# Patient Record
Sex: FEMALE | Race: WHITE
[De-identification: names, ages, dates, MRNs, and addresses within clinical notes are randomized per-mention and may not be internally consistent; named-entity substitution may affect disease eponyms.]

---

## 2017-01-13 ENCOUNTER — HOSPITAL ENCOUNTER (OUTPATIENT)
Age: 52
Discharge: HOME | End: 2017-01-13
Payer: COMMERCIAL

## 2017-01-13 DIAGNOSIS — E03.9: Primary | ICD-10-CM

## 2017-01-13 DIAGNOSIS — F41.8: ICD-10-CM

## 2017-01-13 DIAGNOSIS — Z13.29: ICD-10-CM

## 2017-01-13 DIAGNOSIS — R94.5: ICD-10-CM

## 2017-01-13 DIAGNOSIS — Z13.1: ICD-10-CM

## 2017-01-13 DIAGNOSIS — R19.7: ICD-10-CM

## 2017-01-20 ENCOUNTER — HOSPITAL ENCOUNTER (OUTPATIENT)
Age: 52
Discharge: HOME | End: 2017-01-20
Payer: COMMERCIAL

## 2017-01-20 DIAGNOSIS — Z13.29: ICD-10-CM

## 2017-01-20 DIAGNOSIS — R94.5: ICD-10-CM

## 2017-01-20 DIAGNOSIS — R19.7: ICD-10-CM

## 2017-01-20 DIAGNOSIS — Z13.1: ICD-10-CM

## 2017-01-20 DIAGNOSIS — F41.8: ICD-10-CM

## 2017-01-20 DIAGNOSIS — E03.9: Primary | ICD-10-CM

## 2017-11-22 ENCOUNTER — HOSPITAL ENCOUNTER (OUTPATIENT)
Dept: HOSPITAL 76 - LAB.WCP | Age: 52
Discharge: HOME | End: 2017-11-22
Attending: NATUROPATH
Payer: COMMERCIAL

## 2017-11-22 DIAGNOSIS — E03.9: Primary | ICD-10-CM

## 2017-11-22 LAB — TSH SERPL-ACNC: 4.06 UIU/ML (ref 0.34–5.6)

## 2017-11-22 PROCEDURE — 84481 FREE ASSAY (FT-3): CPT

## 2017-11-22 PROCEDURE — 84482 T3 REVERSE: CPT

## 2017-11-22 PROCEDURE — 84443 ASSAY THYROID STIM HORMONE: CPT

## 2017-11-22 PROCEDURE — 36415 COLL VENOUS BLD VENIPUNCTURE: CPT

## 2017-11-22 PROCEDURE — 84439 ASSAY OF FREE THYROXINE: CPT

## 2018-01-11 ENCOUNTER — HOSPITAL ENCOUNTER (OUTPATIENT)
Dept: HOSPITAL 76 - LAB.R | Age: 53
Discharge: HOME | End: 2018-01-11
Attending: PODIATRIST
Payer: COMMERCIAL

## 2018-01-11 DIAGNOSIS — L03.031: Primary | ICD-10-CM

## 2018-01-11 PROCEDURE — 87205 SMEAR GRAM STAIN: CPT

## 2018-01-11 PROCEDURE — 87070 CULTURE OTHR SPECIMN AEROBIC: CPT

## 2019-03-25 ENCOUNTER — HOSPITAL ENCOUNTER (OUTPATIENT)
Dept: HOSPITAL 76 - LAB.R | Age: 54
Discharge: HOME | End: 2019-03-25
Attending: PODIATRIST
Payer: COMMERCIAL

## 2019-03-25 DIAGNOSIS — L03.031: Primary | ICD-10-CM

## 2019-03-25 PROCEDURE — 87070 CULTURE OTHR SPECIMN AEROBIC: CPT

## 2019-03-25 PROCEDURE — 87205 SMEAR GRAM STAIN: CPT

## 2019-04-18 ENCOUNTER — HOSPITAL ENCOUNTER (OUTPATIENT)
Dept: HOSPITAL 76 - LAB.R | Age: 54
Discharge: HOME | End: 2019-04-18
Attending: PODIATRIST
Payer: COMMERCIAL

## 2019-04-18 DIAGNOSIS — L03.031: Primary | ICD-10-CM

## 2019-04-18 PROCEDURE — 87181 SC STD AGAR DILUTION PER AGT: CPT

## 2019-04-18 PROCEDURE — 87205 SMEAR GRAM STAIN: CPT

## 2019-04-18 PROCEDURE — 87070 CULTURE OTHR SPECIMN AEROBIC: CPT

## 2019-08-29 ENCOUNTER — HOSPITAL ENCOUNTER (EMERGENCY)
Dept: HOSPITAL 76 - ED | Age: 54
Discharge: ANOTHER HOSPITAL | End: 2019-08-29
Payer: COMMERCIAL

## 2019-08-29 ENCOUNTER — HOSPITAL ENCOUNTER (OUTPATIENT)
Dept: HOSPITAL 76 - EMS | Age: 54
Discharge: ANOTHER HOSPITAL | End: 2019-08-29
Attending: SURGERY
Payer: COMMERCIAL

## 2019-08-29 VITALS — SYSTOLIC BLOOD PRESSURE: 139 MMHG | DIASTOLIC BLOOD PRESSURE: 67 MMHG

## 2019-08-29 DIAGNOSIS — D50.0: Primary | ICD-10-CM

## 2019-08-29 DIAGNOSIS — N93.9: ICD-10-CM

## 2019-08-29 DIAGNOSIS — D25.9: ICD-10-CM

## 2019-08-29 DIAGNOSIS — D25.9: Primary | ICD-10-CM

## 2019-08-29 DIAGNOSIS — D64.9: ICD-10-CM

## 2019-08-29 LAB
ALBUMIN DIAFP-MCNC: 3.5 G/DL (ref 3.2–5.5)
ALBUMIN/GLOB SERPL: 1 {RATIO} (ref 1–2.2)
ALP SERPL-CCNC: 76 IU/L (ref 42–121)
ALT SERPL W P-5'-P-CCNC: 61 IU/L (ref 10–60)
ANION GAP SERPL CALCULATED.4IONS-SCNC: 14 MMOL/L (ref 6–13)
AST SERPL W P-5'-P-CCNC: 48 IU/L (ref 10–42)
B-HCG SERPL QL: NEGATIVE
BASOPHILS # BLD MANUAL: 0 10^3/UL (ref 0–0.1)
BASOPHILS NFR BLD AUTO: 0.3 %
BILIRUB BLD-MCNC: 0.6 MG/DL (ref 0.2–1)
BILIRUB UR QL CFM: NEGATIVE
BUN SERPL-MCNC: 11 MG/DL (ref 6–20)
CALCIUM UR-MCNC: 8.9 MG/DL (ref 8.5–10.3)
CASTS URNS QL MICRO: (no result) /LPF
CHLORIDE SERPL-SCNC: 107 MMOL/L (ref 101–111)
CLARITY UR REFRACT.AUTO: CLEAR
CO2 SERPL-SCNC: 19 MMOL/L (ref 21–32)
CREAT SERPLBLD-SCNC: 1 MG/DL (ref 0.4–1)
EOSINOPHIL # BLD MANUAL: 0.2 10^3/UL (ref 0–0.7)
EOSINOPHIL NFR BLD AUTO: 0.5 %
ERYTHROCYTE [DISTWIDTH] IN BLOOD BY AUTOMATED COUNT: 14.1 % (ref 12–15)
GFRSERPLBLD MDRD-ARVRAT: 57 ML/MIN/{1.73_M2} (ref 89–?)
GLOBULIN SER-MCNC: 3.4 G/DL (ref 2.1–4.2)
GLUCOSE SERPL-MCNC: 126 MG/DL (ref 70–100)
GLUCOSE UR QL STRIP.AUTO: NEGATIVE MG/DL
HGB UR QL STRIP: 9.2 G/DL (ref 12–16)
KETONES UR QL STRIP.AUTO: NEGATIVE MG/DL
LIPASE SERPL-CCNC: 21 U/L (ref 22–51)
LYMPH ABN NFR BLD MANUAL: 0 %
LYMPHOBLASTS # BLD: 21 %
LYMPHOCYTES # BLD MANUAL: 4.5 10^3/UL (ref 1.5–3.5)
LYMPHOCYTES NFR BLD AUTO: 16.8 %
MANUAL DIF COMMENT BLD-IMP: (no result)
MCH RBC QN AUTO: 30.7 PG (ref 27–31)
MCHC RBC AUTO-ENTMCNC: 32.2 G/DL (ref 32–36)
MCV RBC AUTO: 95.3 FL (ref 81–99)
METAMYELOCYTES NFR BLD: 2 %
MONOCYTES # BLD MANUAL: 0.4 10^3/UL (ref 0–1)
MONOCYTES NFR BLD AUTO: 6.3 %
MUCOUS THREADS URNS QL MICRO: (no result)
NEUTROPHILS # SNV AUTO: 21.4 X10^3/UL (ref 4.8–10.8)
NEUTROPHILS NFR BLD AUTO: 73.5 %
NEUTS BAND NFR BLD: 4 %
NITRITE UR QL STRIP.AUTO: NEGATIVE
PDW BLD AUTO: 10.3 FL (ref 7.9–10.8)
PH UR STRIP.AUTO: 6 PH (ref 5–7.5)
PLAT MORPH BLD: (no result)
PLATELET # BLD: 270 10^3/UL (ref 130–450)
PLATELET BLD QL SMEAR: (no result)
PROT SPEC-MCNC: 6.9 G/DL (ref 6.7–8.2)
PROT UR STRIP.AUTO-MCNC: 100 MG/DL
RBC # UR STRIP.AUTO: (no result) /UL
RBC MAR: 3 10^6/UL (ref 4.2–5.4)
RBC MORPH BLD: (no result)
SODIUM SERPLBLD-SCNC: 140 MMOL/L (ref 135–145)
SP GR UR STRIP.AUTO: 1.02 (ref 1–1.03)
SQUAMOUS URNS QL MICRO: (no result)
UROBILINOGEN UR QL STRIP.AUTO: (no result) E.U./DL
UROBILINOGEN UR STRIP.AUTO-MCNC: NEGATIVE MG/DL

## 2019-08-29 PROCEDURE — 96365 THER/PROPH/DIAG IV INF INIT: CPT

## 2019-08-29 PROCEDURE — 81003 URINALYSIS AUTO W/O SCOPE: CPT

## 2019-08-29 PROCEDURE — 86900 BLOOD TYPING SEROLOGIC ABO: CPT

## 2019-08-29 PROCEDURE — 85025 COMPLETE CBC W/AUTO DIFF WBC: CPT

## 2019-08-29 PROCEDURE — 84703 CHORIONIC GONADOTROPIN ASSAY: CPT

## 2019-08-29 PROCEDURE — 93005 ELECTROCARDIOGRAM TRACING: CPT

## 2019-08-29 PROCEDURE — 81001 URINALYSIS AUTO W/SCOPE: CPT

## 2019-08-29 PROCEDURE — 85014 HEMATOCRIT: CPT

## 2019-08-29 PROCEDURE — 83690 ASSAY OF LIPASE: CPT

## 2019-08-29 PROCEDURE — 36415 COLL VENOUS BLD VENIPUNCTURE: CPT

## 2019-08-29 PROCEDURE — 80053 COMPREHEN METABOLIC PANEL: CPT

## 2019-08-29 PROCEDURE — 99285 EMERGENCY DEPT VISIT HI MDM: CPT

## 2019-08-29 PROCEDURE — 87086 URINE CULTURE/COLONY COUNT: CPT

## 2019-08-29 PROCEDURE — 96361 HYDRATE IV INFUSION ADD-ON: CPT

## 2019-08-29 PROCEDURE — 96375 TX/PRO/DX INJ NEW DRUG ADDON: CPT

## 2019-08-29 PROCEDURE — 36430 TRANSFUSION BLD/BLD COMPNT: CPT

## 2019-08-29 PROCEDURE — 99284 EMERGENCY DEPT VISIT MOD MDM: CPT

## 2019-08-29 PROCEDURE — 86850 RBC ANTIBODY SCREEN: CPT

## 2019-08-29 PROCEDURE — 86920 COMPATIBILITY TEST SPIN: CPT

## 2019-08-29 PROCEDURE — 51701 INSERT BLADDER CATHETER: CPT

## 2019-08-29 PROCEDURE — 86901 BLOOD TYPING SEROLOGIC RH(D): CPT

## 2019-08-29 NOTE — ED PHYSICIAN DOCUMENTATION
PD HPI FEMALE 





- Stated complaint


Stated Complaint: SOA/SENT BY 





- Chief complaint


Chief Complaint: Cardiac





- History obtained from


History obtained from: Patient





- History of Present Illness


Timing - onset: How many weeks ago (several)


Timing - details: Still present


Associated symptoms: Other (exertional dyspnea)


Recently seen: Clinic (Seen in Gyn clinic yesterday.)





- Additional information


Additional information: 





The patient is a 54-year-old female who has had vaginal bleeding for more than a

month.  She reports shortness of breath for the past 4 days with any exertional 

activity.  She was seen in GYN clinic in [place] yesterday and was referred to 

[facility] for an urgent hysterectomy for fibroids.  CBC yesterday revealed 

hemoglobin of 10.0 and hematocrit 31.  When results were called to her this 

morning and she reported shortness of breath, she was advised to come to the 

emergency department to have repeat lab draw.  She denies chest pain, cough, or 

abdominal pain.  She reports nausea without recent vomiting.  She denies any 

change in the color of her stools.  She denies dysuria.





Review of Systems


Constitutional: denies: Fever


Nose: denies: Congestion


Throat: denies: Sore throat


Cardiac: denies: Chest pain / pressure, Palpitations


Respiratory: reports: Dyspnea (Exertional.).  denies: Cough


GI: reports: Nausea.  denies: Abdominal Pain, Vomiting, Bloody / black stool


: reports: Vaginal bleeding.  denies: Dysuria


Skin: denies: Rash


Musculoskeletal: denies: Back pain


Neurologic: denies: Focal weakness, Numbness, Headache





PD PAST MEDICAL HISTORY





- Past Medical History


Respiratory: Asthma


Endocrine/Autoimmune: HyPOthyroidism


GYN: Fibroids





- Past Surgical History


Past Surgical History: Yes


General: Cholecystectomy


Ortho: Knee replacement





- Present Medications


Home Medications: 


                                Ambulatory Orders











 Medication  Instructions  Recorded  Confirmed


 


Fluticasone/Salmeterol [Advair  09/03/14 09/03/14





250-50 Diskus]   


 


HYDROcod/ACETAM 5/325 [Vicodin 1 - 2 ea PO Q6H PRN #15 tablet 09/03/14 





5/325]   


 


Levothyroxine Sodium  09/03/14 09/03/14














- Allergies


Allergies/Adverse Reactions: 


                                    Allergies











Allergy/AdvReac Type Severity Reaction Status Date / Time


 


No Known Drug Allergies Allergy   Verified 09/03/14 10:38














- Social History


Does the pt smoke?: No


Smoking Status: Never smoker


Does the pt drink ETOH?: Yes


Does the pt have substance abuse?: No





- POLST


Patient has POLST: No





PD ED PE NORMAL





- Vitals


Vital signs reviewed: Yes (Mildly tachycardic with a pulse in the high 90s.)





- General


General: Alert and oriented X 3, Other (Morbidly obese.)





- HEENT


HEENT: Atraumatic, Moist mucous membranes, Pharynx benign





- Neck


Neck: No adenopathy, No JVD





- Cardiac


Cardiac: Other (Mildly tachycardic.)





- Respiratory


Respiratory: No respiratory distress, Clear bilaterally





- Abdomen


Abdomen: Soft, Non tender





- Female 


Female : Other (vaginal bleeding)





- Back


Back: No CVA TTP





- Derm


Derm: No rash





- Extremities


Extremities: No calf tenderness / cord





- Neuro


Neuro: Alert and oriented X 3





Results





- Vitals


Vitals: 


                               Vital Signs - 24 hr











  08/29/19 08/29/19 08/29/19





  10:57 13:16 13:58


 


Temperature 35.7 C L  


 


Heart Rate 125 H 78 99


 


Respiratory 18 19 20





Rate   


 


Blood Pressure 143/98 H 157/81 H 141/84 H


 


O2 Saturation 94 99 97














  08/29/19 08/29/19 08/29/19





  14:10 16:21 18:17


 


Temperature   


 


Heart Rate 100 102 H 79


 


Respiratory 24 19 19





Rate   


 


Blood Pressure 150/83 H 140/92 H 139/74 H


 


O2 Saturation 97 99 99














  08/29/19 08/29/19 08/29/19





  20:04 20:31 20:43


 


Temperature  37 C 37.1 C


 


Heart Rate 78 93 93


 


Respiratory 18 16 16





Rate   


 


Blood Pressure 141/80 H 144/67 H 139/67 H


 


O2 Saturation 98  99








                                     Oxygen











O2 Source                      Room air

















- Labs


Labs: 


                                Laboratory Tests











  08/29/19 08/29/19 08/29/19





  11:18 11:18 12:20


 


WBC  21.4 H  


 


RBC  3.00 L  


 


Hgb  9.2 L  


 


Hct  28.6 L  


 


MCV  95.3  


 


MCH  30.7  


 


MCHC  32.2  


 


RDW  14.1  


 


Plt Count  270  


 


MPV  10.3  


 


Neut # (Auto)  Not Reportable  


 


Lymph # (Auto)  Not Reportable  


 


Mono # (Auto)  Not Reportable  


 


Eos # (Auto)  Not Reportable  


 


Baso # (Auto)  Not Reportable  


 


Absolute Nucleated RBC  Not Reportable  


 


Total Counted  100  


 


Band Neuts % (Manual)  4  


 


Abnorm Lymph % (Manual)  0  


 


Metamyelocytes %  2 H  


 


Nucleated RBC %  Not Reportable  


 


Neutrophils # (Manual)  15.8 H  


 


Lymphocytes # (Manual)  4.5 H  


 


Monocytes # (Manual)  0.4  


 


Eosinophils # (Manual)  0.2  


 


Basophils # (Manual)  0.0  


 


Differential Comment  MANUAL DIFFERENTIAL  


 


WBC Morphology  NORMAL APPEARANCE  


 


Platelet Estimate  NORMAL (130-450,000)  


 


Platelet Morphology  NORMAL СВЕТЛАНА  


 


RBC Morph Micro Appear  1+ POLYCHROMASIA  


 


Sodium   140 


 


Potassium   3.5 


 


Chloride   107 


 


Carbon Dioxide   19 L 


 


Anion Gap   14.0 H 


 


BUN   11 


 


Creatinine   1.0 


 


Estimated GFR (MDRD)   57 L 


 


Glucose   126 H 


 


Calcium   8.9 


 


Total Bilirubin   0.6 


 


AST   48 H 


 


ALT   61 H 


 


Alkaline Phosphatase   76 


 


Total Protein   6.9 


 


Albumin   3.5 


 


Globulin   3.4 


 


Albumin/Globulin Ratio   1.0 


 


Lipase   21 L 


 


Serum HCG, Qual   


 


Urine Color    YELLOW


 


Urine Clarity    CLEAR


 


Urine pH    6.0


 


Ur Specific Gravity    1.025


 


Urine Protein    100 H


 


Urine Glucose (UA)    NEGATIVE


 


Urine Ketones    NEGATIVE


 


Urine Occult Blood    LARGE H


 


Urine Nitrite    NEGATIVE


 


Urine Bilirubin    NEGATIVE


 


Urine Urobilinogen    0.2 (NORMAL)


 


Ur Leukocyte Esterase    NEGATIVE


 


Urine RBC    11-25 H


 


Urine WBC    0-3


 


Ur Squamous Epith Cells    FEW Squamous


 


Urine Bacteria    Few


 


Urine Casts    0-2 RBC Casts


 


Urine Mucus    Few Strands


 


Ur Microscopic Review    INDICATED


 


Urine Culture Comments    NOT INDICATED


 


Blood Type   


 


Antibody Screen   


 


Crossmatch IS Only   














  08/29/19 08/29/19 08/29/19





  15:51 15:51 16:12


 


WBC   


 


RBC   


 


Hgb   


 


Hct  26.8 L  


 


MCV   


 


MCH   


 


MCHC   


 


RDW   


 


Plt Count   


 


MPV   


 


Neut # (Auto)   


 


Lymph # (Auto)   


 


Mono # (Auto)   


 


Eos # (Auto)   


 


Baso # (Auto)   


 


Absolute Nucleated RBC   


 


Total Counted   


 


Band Neuts % (Manual)   


 


Abnorm Lymph % (Manual)   


 


Metamyelocytes %   


 


Nucleated RBC %   


 


Neutrophils # (Manual)   


 


Lymphocytes # (Manual)   


 


Monocytes # (Manual)   


 


Eosinophils # (Manual)   


 


Basophils # (Manual)   


 


Differential Comment   


 


WBC Morphology   


 


Platelet Estimate   


 


Platelet Morphology   


 


RBC Morph Micro Appear   


 


Sodium   


 


Potassium   


 


Chloride   


 


Carbon Dioxide   


 


Anion Gap   


 


BUN   


 


Creatinine   


 


Estimated GFR (MDRD)   


 


Glucose   


 


Calcium   


 


Total Bilirubin   


 


AST   


 


ALT   


 


Alkaline Phosphatase   


 


Total Protein   


 


Albumin   


 


Globulin   


 


Albumin/Globulin Ratio   


 


Lipase   


 


Serum HCG, Qual   NEGATIVE 


 


Urine Color   


 


Urine Clarity   


 


Urine pH   


 


Ur Specific Gravity   


 


Urine Protein   


 


Urine Glucose (UA)   


 


Urine Ketones   


 


Urine Occult Blood   


 


Urine Nitrite   


 


Urine Bilirubin   


 


Urine Urobilinogen   


 


Ur Leukocyte Esterase   


 


Urine RBC   


 


Urine WBC   


 


Ur Squamous Epith Cells   


 


Urine Bacteria   


 


Urine Casts   


 


Urine Mucus   


 


Ur Microscopic Review   


 


Urine Culture Comments   


 


Blood Type    O POSITIVE


 


Antibody Screen    NEGATIVE


 


Crossmatch IS Only    See Detail














PD MEDICAL DECISION MAKING





- ED course


Complexity details: reviewed results, re-evaluated patient, considered 

differential, d/w patient, d/w family, d/w consultant


ED course: 





The patient's presentation is significant for ongoing vaginal bleeding with 

large uterine fibroids.  Her hemoglobin and hematocrit today are 9.2 and 28.6, 

which is slightly lower than yesterday.  Her white blood cell count is elevated 

at 21.4.  There is no evidence of pulmonary or urinary infection.  I discussed 

her presentation with Dr. Andujar, the gynecologist she saw yesterday.  He shares 

that the patient was referred urgently to [facility] for urgent hysterectomy.  

He was concerned with her history of sleep apnea and her obese body habitus that

hers would be a high risk surgical procedure.


Attempt to reach gynecologist on-call at [facility] was unsuccessful.  I 

was told by the triage person that since the patient is not established at 

[facility], I should fax all the clinical information and the on-call gynecologist 

would review that and call back.  More than 2 hours after faxing the information

to [facility] I have still not received a phone call.  Repeat hematocrit

was performed and was lower at 26.8, 4 hours after the initial hematocrit today 

of 28.6.  This is without her receiving any IV fluids.  She remained tachycardic

at rest, and continued to gush vaginal blood.  I discussed her condition again 

with Dr. Andujar, who agrees that the patient should be transferred to [place] for 

stabilization prior to undergoing any necessary surgical procedure.  After 

getting authorization from the [place] triage person, I discussed the patient's 

condition with the Dr. Chavez at [facility]. She then 

discussed transfer with her colleague, Dr. Gay, who agrees to accept the 

patient in transfer.  She agrees with starting a transfusion of packed red blood

cells, and advises administering 1 g of TXA.  Treatment in addition to those 

recommendations, included administration of fentanyl 75 mcg IV, followed later 

with morphine 4 mg IV.  Transfer forms were completed.





Departure





- Departure


Disposition: 02 Transfer Acute Care Hosp


Clinical Impression: 


 Vaginal bleeding, Anemia





Uterine fibroid


Qualifiers:


 Uterine leiomyoma location: unspecified location Qualified Code(s): D25.9 - 

Leiomyoma of uterus, unspecified





Dyspnea


Qualifiers:


 Dyspnea type: dyspnea on exertion Qualified Code(s): R06.09 - Other forms of 

dyspnea





Condition: Serious


Discharge Date/Time: 08/29/19 21:00

## 2019-08-29 NOTE — ED PHYSICIAN DOCUMENTATION
PD HPI Fall





- Stated complaint


Stated Complaint: SOA/SENT BY 





- Chief complaint


Chief Complaint: Cardiac





- History obtained from


History obtained from: Patient, EMS





PD PAST MEDICAL HISTORY





- Past Medical History


Respiratory: Asthma


Endocrine/Autoimmune: HyPOthyroidism





- Past Surgical History


Past Surgical History: Yes


General: Cholecystectomy


Ortho: Knee replacement





- Present Medications


Home Medications: 


                                Ambulatory Orders











 Medication  Instructions  Recorded  Confirmed


 


Fluticasone/Salmeterol [Advair  09/03/14 09/03/14





250-50 Diskus]   


 


HYDROcod/ACETAM 5/325 [Vicodin 1 - 2 ea PO Q6H PRN #15 tablet 09/03/14 





5/325]   


 


Levothyroxine Sodium  09/03/14 09/03/14














- Allergies


Allergies/Adverse Reactions: 


                                    Allergies











Allergy/AdvReac Type Severity Reaction Status Date / Time


 


No Known Drug Allergies Allergy   Verified 09/03/14 10:38














- Social History


Does the pt smoke?: No


Smoking Status: Never smoker


Does the pt drink ETOH?: Yes


Does the pt have substance abuse?: No





- POLST


Patient has POLST: No





Results





- Vitals


Vitals: 


                               Vital Signs - 24 hr











  08/29/19





  10:57


 


Temperature 35.7 C L


 


Heart Rate 125 H


 


Respiratory 18





Rate 


 


Blood Pressure 143/98 H


 


O2 Saturation 94








                                     Oxygen











O2 Source                      Room air

















- EKG (time done)


  ** 11:08


Rate: Rate (enter#) (113)


Rhythm: Sinus tachycardia


Axis: Normal


Intervals: Other (IVCD)


QRS: Poor R wave progression


Compare to prior EKG: Old EKG unavailable


Computer interpretation: Agree with computer





- Labs


Labs: 


                                Laboratory Tests











  08/29/19 08/29/19 08/29/19





  11:18 11:18 12:20


 


WBC  21.4 H  


 


RBC  3.00 L  


 


Hgb  9.2 L  


 


Hct  28.6 L  


 


MCV  95.3  


 


MCH  30.7  


 


MCHC  32.2  


 


RDW  14.1  


 


Plt Count  270  


 


MPV  10.3  


 


Neut # (Auto)  Not Reportable  


 


Lymph # (Auto)  Not Reportable  


 


Mono # (Auto)  Not Reportable  


 


Eos # (Auto)  Not Reportable  


 


Baso # (Auto)  Not Reportable  


 


Absolute Nucleated RBC  Not Reportable  


 


Total Counted  100  


 


Band Neuts % (Manual)  4  


 


Abnorm Lymph % (Manual)  0  


 


Metamyelocytes %  2 H  


 


Nucleated RBC %  Not Reportable  


 


Neutrophils # (Manual)  15.8 H  


 


Lymphocytes # (Manual)  4.5 H  


 


Monocytes # (Manual)  0.4  


 


Eosinophils # (Manual)  0.2  


 


Basophils # (Manual)  0.0  


 


Differential Comment  MANUAL DIFFERENTIAL  


 


WBC Morphology  NORMAL APPEARANCE  


 


Platelet Estimate  NORMAL (130-450,000)  


 


Platelet Morphology  NORMAL СВЕТЛАНА  


 


RBC Morph Micro Appear  1+ POLYCHROMASIA  


 


Sodium   140 


 


Potassium   3.5 


 


Chloride   107 


 


Carbon Dioxide   19 L 


 


Anion Gap   14.0 H 


 


BUN   11 


 


Creatinine   1.0 


 


Estimated GFR (MDRD)   57 L 


 


Glucose   126 H 


 


Calcium   8.9 


 


Total Bilirubin   0.6 


 


AST   48 H 


 


ALT   61 H 


 


Alkaline Phosphatase   76 


 


Total Protein   6.9 


 


Albumin   3.5 


 


Globulin   3.4 


 


Albumin/Globulin Ratio   1.0 


 


Lipase   21 L 


 


Urine Color    YELLOW


 


Urine Clarity    CLEAR


 


Urine pH    6.0


 


Ur Specific Gravity    1.025


 


Urine Protein    100 H


 


Urine Glucose (UA)    NEGATIVE


 


Urine Ketones    NEGATIVE


 


Urine Occult Blood    LARGE H


 


Urine Nitrite    NEGATIVE


 


Urine Bilirubin    NEGATIVE


 


Urine Urobilinogen    0.2 (NORMAL)


 


Ur Leukocyte Esterase    NEGATIVE


 


Urine RBC    11-25 H


 


Urine WBC    0-3


 


Ur Squamous Epith Cells    FEW Squamous


 


Urine Bacteria    Few


 


Urine Casts    0-2 RBC Casts


 


Urine Mucus    Few Strands


 


Ur Microscopic Review    INDICATED


 


Urine Culture Comments    NOT INDICATED














- Rads (name of study)


  ** right hip


Radiology: Prelim report reviewed, EMP read contemporaneously, See rad report 

(Fixation hardware along the proximal right femur with no hardware failure or 

loosening.  No acute fracture identified.)

## 2019-09-12 ENCOUNTER — HOSPITAL ENCOUNTER (OUTPATIENT)
Dept: HOSPITAL 76 - LAB.N | Age: 54
Discharge: HOME | End: 2019-09-12
Attending: FAMILY MEDICINE
Payer: COMMERCIAL

## 2019-09-12 DIAGNOSIS — I26.99: ICD-10-CM

## 2019-09-12 DIAGNOSIS — E03.9: Primary | ICD-10-CM

## 2019-09-12 DIAGNOSIS — D64.9: ICD-10-CM

## 2019-09-12 LAB
BASOPHILS NFR BLD AUTO: 0 10^3/UL (ref 0–0.1)
BASOPHILS NFR BLD AUTO: 0.4 %
CREAT SERPLBLD-SCNC: 0.9 MG/DL (ref 0.4–1)
EOSINOPHIL # BLD AUTO: 0.2 10^3/UL (ref 0–0.7)
EOSINOPHIL NFR BLD AUTO: 2.3 %
ERYTHROCYTE [DISTWIDTH] IN BLOOD BY AUTOMATED COUNT: 15.9 % (ref 12–15)
GFRSERPLBLD MDRD-ARVRAT: 65 ML/MIN/{1.73_M2} (ref 89–?)
HGB UR QL STRIP: 10.9 G/DL (ref 12–16)
INR PPP: 3.2 (ref 0.8–1.2)
LYMPHOCYTES # SPEC AUTO: 3.4 10^3/UL (ref 1.5–3.5)
LYMPHOCYTES NFR BLD AUTO: 33.1 %
MANUAL DIF COMMENT BLD-IMP: (no result)
MCH RBC QN AUTO: 28.7 PG (ref 27–31)
MCHC RBC AUTO-ENTMCNC: 29.9 G/DL (ref 32–36)
MCV RBC AUTO: 96.1 FL (ref 81–99)
MONOCYTES # BLD AUTO: 0.8 10^3/UL (ref 0–1)
MONOCYTES NFR BLD AUTO: 8 %
NEUTROPHILS # BLD AUTO: 5.2 10^3/UL (ref 1.5–6.6)
NEUTROPHILS # SNV AUTO: 10.2 X10^3/UL (ref 4.8–10.8)
NEUTROPHILS NFR BLD AUTO: 50.7 %
PDW BLD AUTO: 9.9 FL (ref 7.9–10.8)
PLAT MORPH BLD: (no result)
PLATELET # BLD: 438 10^3/UL (ref 130–450)
PLATELET BLD QL SMEAR: (no result)
PROTHROM ACT/NOR PPP: 35.6 SECS (ref 9.9–12.6)
RBC MAR: 3.8 10^6/UL (ref 4.2–5.4)
RBC MORPH BLD: (no result)

## 2019-09-12 PROCEDURE — 85610 PROTHROMBIN TIME: CPT

## 2019-09-12 PROCEDURE — 85025 COMPLETE CBC W/AUTO DIFF WBC: CPT

## 2019-09-12 PROCEDURE — 36415 COLL VENOUS BLD VENIPUNCTURE: CPT

## 2019-09-12 PROCEDURE — 82565 ASSAY OF CREATININE: CPT

## 2019-09-12 PROCEDURE — 84443 ASSAY THYROID STIM HORMONE: CPT

## 2019-09-17 ENCOUNTER — HOSPITAL ENCOUNTER (OUTPATIENT)
Dept: HOSPITAL 76 - LAB.N | Age: 54
Discharge: HOME | End: 2019-09-17
Attending: EMERGENCY MEDICINE
Payer: COMMERCIAL

## 2019-09-17 DIAGNOSIS — I26.99: Primary | ICD-10-CM

## 2019-09-17 PROCEDURE — 85610 PROTHROMBIN TIME: CPT

## 2019-09-19 ENCOUNTER — HOSPITAL ENCOUNTER (OUTPATIENT)
Dept: HOSPITAL 76 - LAB.N | Age: 54
Discharge: HOME | End: 2019-09-19
Attending: EMERGENCY MEDICINE
Payer: COMMERCIAL

## 2019-09-19 DIAGNOSIS — I26.99: Primary | ICD-10-CM

## 2019-09-19 PROCEDURE — 85610 PROTHROMBIN TIME: CPT

## 2019-09-23 ENCOUNTER — HOSPITAL ENCOUNTER (OUTPATIENT)
Dept: HOSPITAL 76 - LAB.N | Age: 54
Discharge: HOME | End: 2019-09-23
Attending: EMERGENCY MEDICINE
Payer: COMMERCIAL

## 2019-09-23 DIAGNOSIS — I26.99: Primary | ICD-10-CM

## 2019-09-23 PROCEDURE — 85610 PROTHROMBIN TIME: CPT

## 2019-10-01 ENCOUNTER — HOSPITAL ENCOUNTER (OUTPATIENT)
Dept: HOSPITAL 76 - LAB.N | Age: 54
Discharge: HOME | End: 2019-10-01
Attending: EMERGENCY MEDICINE
Payer: COMMERCIAL

## 2019-10-01 DIAGNOSIS — I26.99: Primary | ICD-10-CM

## 2019-10-01 PROCEDURE — 85610 PROTHROMBIN TIME: CPT

## 2019-10-08 ENCOUNTER — HOSPITAL ENCOUNTER (OUTPATIENT)
Dept: HOSPITAL 76 - LAB.N | Age: 54
Discharge: HOME | End: 2019-10-08
Attending: EMERGENCY MEDICINE
Payer: COMMERCIAL

## 2019-10-08 DIAGNOSIS — I26.99: Primary | ICD-10-CM

## 2019-10-08 PROCEDURE — 85610 PROTHROMBIN TIME: CPT

## 2019-10-24 ENCOUNTER — HOSPITAL ENCOUNTER (OUTPATIENT)
Dept: HOSPITAL 76 - LAB.N | Age: 54
Discharge: HOME | End: 2019-10-24
Attending: EMERGENCY MEDICINE
Payer: COMMERCIAL

## 2019-10-24 DIAGNOSIS — I26.99: Primary | ICD-10-CM

## 2019-10-24 LAB
BASOPHILS NFR BLD AUTO: 0.1 10^3/UL (ref 0–0.1)
BASOPHILS NFR BLD AUTO: 0.6 %
EOSINOPHIL # BLD AUTO: 0.4 10^3/UL (ref 0–0.7)
EOSINOPHIL NFR BLD AUTO: 4.1 %
ERYTHROCYTE [DISTWIDTH] IN BLOOD BY AUTOMATED COUNT: 14.6 % (ref 12–15)
HGB UR QL STRIP: 9.6 G/DL (ref 12–16)
LYMPHOCYTES # SPEC AUTO: 3.4 10^3/UL (ref 1.5–3.5)
LYMPHOCYTES NFR BLD AUTO: 38.6 %
MCH RBC QN AUTO: 28.3 PG (ref 27–31)
MCHC RBC AUTO-ENTMCNC: 29.5 G/DL (ref 32–36)
MCV RBC AUTO: 95.9 FL (ref 81–99)
MONOCYTES # BLD AUTO: 0.6 10^3/UL (ref 0–1)
MONOCYTES NFR BLD AUTO: 6.5 %
NEUTROPHILS # BLD AUTO: 4.3 10^3/UL (ref 1.5–6.6)
NEUTROPHILS # SNV AUTO: 8.9 X10^3/UL (ref 4.8–10.8)
NEUTROPHILS NFR BLD AUTO: 48.8 %
PDW BLD AUTO: 9.7 FL (ref 7.9–10.8)
PLATELET # BLD: 425 10^3/UL (ref 130–450)
RBC MAR: 3.39 10^6/UL (ref 4.2–5.4)

## 2019-10-24 PROCEDURE — 85025 COMPLETE CBC W/AUTO DIFF WBC: CPT

## 2019-10-24 PROCEDURE — 36415 COLL VENOUS BLD VENIPUNCTURE: CPT

## 2019-10-24 PROCEDURE — 85610 PROTHROMBIN TIME: CPT

## 2019-10-28 ENCOUNTER — HOSPITAL ENCOUNTER (OUTPATIENT)
Dept: HOSPITAL 76 - LAB.N | Age: 54
End: 2019-10-28
Attending: EMERGENCY MEDICINE
Payer: COMMERCIAL

## 2019-10-28 DIAGNOSIS — I26.99: Primary | ICD-10-CM

## 2019-10-28 PROCEDURE — 85610 PROTHROMBIN TIME: CPT

## 2019-10-31 ENCOUNTER — HOSPITAL ENCOUNTER (OUTPATIENT)
Dept: HOSPITAL 76 - LAB.N | Age: 54
Discharge: HOME | End: 2019-10-31
Attending: EMERGENCY MEDICINE
Payer: COMMERCIAL

## 2019-10-31 DIAGNOSIS — I26.99: Primary | ICD-10-CM

## 2019-10-31 LAB
BASOPHILS NFR BLD AUTO: 0.1 10^3/UL (ref 0–0.1)
BASOPHILS NFR BLD AUTO: 0.8 %
EOSINOPHIL # BLD AUTO: 0.3 10^3/UL (ref 0–0.7)
EOSINOPHIL NFR BLD AUTO: 3.3 %
ERYTHROCYTE [DISTWIDTH] IN BLOOD BY AUTOMATED COUNT: 15.8 % (ref 12–15)
HGB UR QL STRIP: 10.4 G/DL (ref 12–16)
LYMPHOCYTES # SPEC AUTO: 3.5 10^3/UL (ref 1.5–3.5)
LYMPHOCYTES NFR BLD AUTO: 38.6 %
MCH RBC QN AUTO: 27.2 PG (ref 27–31)
MCHC RBC AUTO-ENTMCNC: 28.9 G/DL (ref 32–36)
MCV RBC AUTO: 94.2 FL (ref 81–99)
MONOCYTES # BLD AUTO: 0.7 10^3/UL (ref 0–1)
MONOCYTES NFR BLD AUTO: 7.4 %
NEUTROPHILS # BLD AUTO: 4.5 10^3/UL (ref 1.5–6.6)
NEUTROPHILS # SNV AUTO: 9.1 X10^3/UL (ref 4.8–10.8)
NEUTROPHILS NFR BLD AUTO: 49.2 %
PDW BLD AUTO: 9.7 FL (ref 7.9–10.8)
PLAT MORPH BLD: (no result)
PLATELET # BLD: 390 10^3/UL (ref 130–450)
PLATELET BLD QL SMEAR: (no result)
RBC MAR: 3.82 10^6/UL (ref 4.2–5.4)
RBC MORPH BLD: (no result)

## 2019-10-31 PROCEDURE — 36415 COLL VENOUS BLD VENIPUNCTURE: CPT

## 2019-10-31 PROCEDURE — 85025 COMPLETE CBC W/AUTO DIFF WBC: CPT

## 2019-10-31 PROCEDURE — 85610 PROTHROMBIN TIME: CPT

## 2019-11-04 ENCOUNTER — HOSPITAL ENCOUNTER (OUTPATIENT)
Dept: HOSPITAL 76 - LAB.N | Age: 54
Discharge: HOME | End: 2019-11-04
Attending: EMERGENCY MEDICINE
Payer: COMMERCIAL

## 2019-11-04 DIAGNOSIS — I26.99: Primary | ICD-10-CM

## 2019-11-04 PROCEDURE — 85610 PROTHROMBIN TIME: CPT

## 2019-11-11 ENCOUNTER — HOSPITAL ENCOUNTER (OUTPATIENT)
Dept: HOSPITAL 76 - LAB.N | Age: 54
Discharge: HOME | End: 2019-11-11
Attending: EMERGENCY MEDICINE
Payer: COMMERCIAL

## 2019-11-11 DIAGNOSIS — I26.99: Primary | ICD-10-CM

## 2019-11-11 PROCEDURE — 85610 PROTHROMBIN TIME: CPT

## 2019-11-25 ENCOUNTER — HOSPITAL ENCOUNTER (OUTPATIENT)
Dept: HOSPITAL 76 - LAB.N | Age: 54
Discharge: HOME | End: 2019-11-25
Attending: EMERGENCY MEDICINE
Payer: COMMERCIAL

## 2019-11-25 DIAGNOSIS — I26.99: Primary | ICD-10-CM

## 2019-11-25 PROCEDURE — 85610 PROTHROMBIN TIME: CPT

## 2019-12-09 ENCOUNTER — HOSPITAL ENCOUNTER (OUTPATIENT)
Dept: HOSPITAL 76 - LAB.N | Age: 54
Discharge: HOME | End: 2019-12-09
Attending: EMERGENCY MEDICINE
Payer: COMMERCIAL

## 2019-12-09 DIAGNOSIS — I26.99: Primary | ICD-10-CM

## 2019-12-09 PROCEDURE — 85610 PROTHROMBIN TIME: CPT

## 2019-12-30 ENCOUNTER — HOSPITAL ENCOUNTER (OUTPATIENT)
Dept: HOSPITAL 76 - LAB.N | Age: 54
Discharge: HOME | End: 2019-12-30
Attending: EMERGENCY MEDICINE
Payer: COMMERCIAL

## 2019-12-30 DIAGNOSIS — I26.99: Primary | ICD-10-CM

## 2019-12-30 PROCEDURE — 85610 PROTHROMBIN TIME: CPT

## 2020-01-08 ENCOUNTER — HOSPITAL ENCOUNTER (OUTPATIENT)
Dept: HOSPITAL 76 - LAB.N | Age: 55
Discharge: HOME | End: 2020-01-08
Attending: EMERGENCY MEDICINE
Payer: COMMERCIAL

## 2020-01-08 DIAGNOSIS — I26.99: Primary | ICD-10-CM

## 2020-01-08 PROCEDURE — 85610 PROTHROMBIN TIME: CPT

## 2020-01-22 ENCOUNTER — HOSPITAL ENCOUNTER (OUTPATIENT)
Dept: HOSPITAL 76 - LAB.N | Age: 55
Discharge: HOME | End: 2020-01-22
Attending: EMERGENCY MEDICINE
Payer: COMMERCIAL

## 2020-01-22 DIAGNOSIS — I26.99: Primary | ICD-10-CM

## 2020-01-22 PROCEDURE — 85610 PROTHROMBIN TIME: CPT

## 2020-02-12 ENCOUNTER — HOSPITAL ENCOUNTER (OUTPATIENT)
Dept: HOSPITAL 76 - LAB.N | Age: 55
Discharge: HOME | End: 2020-02-12
Attending: FAMILY MEDICINE
Payer: COMMERCIAL

## 2020-02-12 DIAGNOSIS — I26.99: Primary | ICD-10-CM

## 2020-02-12 PROCEDURE — 85610 PROTHROMBIN TIME: CPT

## 2020-03-04 ENCOUNTER — HOSPITAL ENCOUNTER (OUTPATIENT)
Dept: HOSPITAL 76 - LAB.N | Age: 55
Discharge: HOME | End: 2020-03-04
Attending: EMERGENCY MEDICINE
Payer: COMMERCIAL

## 2020-03-04 DIAGNOSIS — I26.99: Primary | ICD-10-CM

## 2020-03-04 PROCEDURE — 85610 PROTHROMBIN TIME: CPT

## 2020-03-18 ENCOUNTER — HOSPITAL ENCOUNTER (OUTPATIENT)
Dept: HOSPITAL 76 - LAB.N | Age: 55
Discharge: HOME | End: 2020-03-18
Attending: EMERGENCY MEDICINE
Payer: COMMERCIAL

## 2020-03-18 DIAGNOSIS — I26.99: Primary | ICD-10-CM

## 2020-03-18 PROCEDURE — 85610 PROTHROMBIN TIME: CPT

## 2020-08-05 ENCOUNTER — HOSPITAL ENCOUNTER (OUTPATIENT)
Dept: HOSPITAL 76 - LAB.WCP | Age: 55
Discharge: HOME | End: 2020-08-05
Attending: FAMILY MEDICINE
Payer: COMMERCIAL

## 2020-08-05 DIAGNOSIS — E03.9: ICD-10-CM

## 2020-08-05 DIAGNOSIS — N28.9: Primary | ICD-10-CM

## 2020-08-05 DIAGNOSIS — D64.9: ICD-10-CM

## 2020-08-05 DIAGNOSIS — E55.9: ICD-10-CM

## 2020-08-05 LAB
ALBUMIN DIAFP-MCNC: 4.2 G/DL (ref 3.2–5.5)
ALBUMIN/GLOB SERPL: 1.3 {RATIO} (ref 1–2.2)
ALP SERPL-CCNC: 96 IU/L (ref 42–121)
ALT SERPL W P-5'-P-CCNC: 33 IU/L (ref 10–60)
ANION GAP SERPL CALCULATED.4IONS-SCNC: 11 MMOL/L (ref 6–13)
AST SERPL W P-5'-P-CCNC: 30 IU/L (ref 10–42)
BASOPHILS NFR BLD AUTO: 0 10^3/UL (ref 0–0.1)
BASOPHILS NFR BLD AUTO: 0.4 %
BILIRUB BLD-MCNC: 0.7 MG/DL (ref 0.2–1)
BUN SERPL-MCNC: 12 MG/DL (ref 6–20)
CALCIUM UR-MCNC: 9.2 MG/DL (ref 8.5–10.3)
CHLORIDE SERPL-SCNC: 106 MMOL/L (ref 101–111)
CHOLEST SERPL-MCNC: 267 MG/DL
CO2 SERPL-SCNC: 23 MMOL/L (ref 21–32)
CREAT SERPLBLD-SCNC: 0.9 MG/DL (ref 0.4–1)
EOSINOPHIL # BLD AUTO: 0.1 10^3/UL (ref 0–0.7)
EOSINOPHIL NFR BLD AUTO: 1.2 %
ERYTHROCYTE [DISTWIDTH] IN BLOOD BY AUTOMATED COUNT: 14.1 % (ref 12–15)
GLOBULIN SER-MCNC: 3.3 G/DL (ref 2.1–4.2)
GLUCOSE SERPL-MCNC: 132 MG/DL (ref 70–100)
HDLC SERPL-MCNC: 65 MG/DL
HDLC SERPL: 4.1 {RATIO} (ref ?–4.4)
HGB UR QL STRIP: 14.9 G/DL (ref 12–16)
LDLC SERPL CALC-MCNC: 173 MG/DL
LDLC/HDLC SERPL: 2.7 {RATIO} (ref ?–4.4)
LYMPHOCYTES # SPEC AUTO: 3.4 10^3/UL (ref 1.5–3.5)
LYMPHOCYTES NFR BLD AUTO: 37.7 %
MCH RBC QN AUTO: 29.9 PG (ref 27–31)
MCHC RBC AUTO-ENTMCNC: 31 G/DL (ref 32–36)
MCV RBC AUTO: 96.4 FL (ref 81–99)
MONOCYTES # BLD AUTO: 0.6 10^3/UL (ref 0–1)
MONOCYTES NFR BLD AUTO: 6.9 %
NEUTROPHILS # BLD AUTO: 4.7 10^3/UL (ref 1.5–6.6)
NEUTROPHILS # SNV AUTO: 8.9 X10^3/UL (ref 4.8–10.8)
NEUTROPHILS NFR BLD AUTO: 53.1 %
PDW BLD AUTO: 10.4 FL (ref 7.9–10.8)
PLATELET # BLD: 243 10^3/UL (ref 130–450)
PROT SPEC-MCNC: 7.5 G/DL (ref 6.7–8.2)
RBC MAR: 4.98 10^6/UL (ref 4.2–5.4)
SODIUM SERPLBLD-SCNC: 140 MMOL/L (ref 135–145)
VLDLC SERPL-SCNC: 29 MG/DL

## 2020-08-05 PROCEDURE — 80053 COMPREHEN METABOLIC PANEL: CPT

## 2020-08-05 PROCEDURE — 80061 LIPID PANEL: CPT

## 2020-08-05 PROCEDURE — 36415 COLL VENOUS BLD VENIPUNCTURE: CPT

## 2020-08-05 PROCEDURE — 85025 COMPLETE CBC W/AUTO DIFF WBC: CPT

## 2020-08-05 PROCEDURE — 84443 ASSAY THYROID STIM HORMONE: CPT

## 2020-08-05 PROCEDURE — 83721 ASSAY OF BLOOD LIPOPROTEIN: CPT

## 2020-08-05 PROCEDURE — 82306 VITAMIN D 25 HYDROXY: CPT

## 2020-12-18 ENCOUNTER — HOSPITAL ENCOUNTER (OUTPATIENT)
Dept: HOSPITAL 76 - LAB.WCP | Age: 55
End: 2020-12-18
Attending: NURSE PRACTITIONER
Payer: COMMERCIAL

## 2020-12-18 DIAGNOSIS — E03.9: Primary | ICD-10-CM

## 2020-12-18 PROCEDURE — 84443 ASSAY THYROID STIM HORMONE: CPT

## 2020-12-18 PROCEDURE — 36415 COLL VENOUS BLD VENIPUNCTURE: CPT

## 2021-02-03 ENCOUNTER — HOSPITAL ENCOUNTER (OUTPATIENT)
Dept: HOSPITAL 76 - LAB.WCP | Age: 56
Discharge: HOME | End: 2021-02-03
Attending: NURSE PRACTITIONER
Payer: COMMERCIAL

## 2021-02-03 DIAGNOSIS — E03.9: Primary | ICD-10-CM

## 2021-02-03 LAB
T4 FREE SERPL-MCNC: 0.82 NG/DL (ref 0.58–1.64)
TSH SERPL-ACNC: 3.11 UIU/ML (ref 0.34–5.6)

## 2021-02-03 PROCEDURE — 36415 COLL VENOUS BLD VENIPUNCTURE: CPT

## 2021-02-03 PROCEDURE — 84443 ASSAY THYROID STIM HORMONE: CPT

## 2021-02-03 PROCEDURE — 84439 ASSAY OF FREE THYROXINE: CPT

## 2021-02-26 ENCOUNTER — HOSPITAL ENCOUNTER (OUTPATIENT)
Dept: HOSPITAL 76 - LAB.WCP | Age: 56
Discharge: HOME | End: 2021-02-26
Attending: NURSE PRACTITIONER
Payer: COMMERCIAL

## 2021-02-26 DIAGNOSIS — D64.9: ICD-10-CM

## 2021-02-26 DIAGNOSIS — R60.9: Primary | ICD-10-CM

## 2021-02-26 DIAGNOSIS — E03.9: ICD-10-CM

## 2021-02-26 LAB
ALBUMIN DIAFP-MCNC: 4.4 G/DL (ref 3.2–5.5)
ALBUMIN/GLOB SERPL: 1.4 {RATIO} (ref 1–2.2)
ALP SERPL-CCNC: 102 IU/L (ref 42–121)
ALT SERPL W P-5'-P-CCNC: 37 IU/L (ref 10–60)
ANION GAP SERPL CALCULATED.4IONS-SCNC: 13 MMOL/L (ref 6–13)
AST SERPL W P-5'-P-CCNC: 34 IU/L (ref 10–42)
BASOPHILS NFR BLD AUTO: 0 10^3/UL (ref 0–0.1)
BASOPHILS NFR BLD AUTO: 0.4 %
BILIRUB BLD-MCNC: 0.8 MG/DL (ref 0.2–1)
BUN SERPL-MCNC: 11 MG/DL (ref 6–20)
CALCIUM UR-MCNC: 9.2 MG/DL (ref 8.5–10.3)
CHLORIDE SERPL-SCNC: 103 MMOL/L (ref 101–111)
CO2 SERPL-SCNC: 23 MMOL/L (ref 21–32)
CREAT SERPLBLD-SCNC: 1 MG/DL (ref 0.4–1)
EOSINOPHIL # BLD AUTO: 0.1 10^3/UL (ref 0–0.7)
EOSINOPHIL NFR BLD AUTO: 1 %
ERYTHROCYTE [DISTWIDTH] IN BLOOD BY AUTOMATED COUNT: 13.6 % (ref 12–15)
GFRSERPLBLD MDRD-ARVRAT: 57 ML/MIN/{1.73_M2} (ref 89–?)
GLOBULIN SER-MCNC: 3.2 G/DL (ref 2.1–4.2)
GLUCOSE SERPL-MCNC: 121 MG/DL (ref 70–100)
HCT VFR BLD AUTO: 49 % (ref 37–47)
HGB UR QL STRIP: 16 G/DL (ref 12–16)
LYMPHOCYTES # SPEC AUTO: 2.3 10^3/UL (ref 1.5–3.5)
LYMPHOCYTES NFR BLD AUTO: 30.9 %
MCH RBC QN AUTO: 31.3 PG (ref 27–31)
MCHC RBC AUTO-ENTMCNC: 32.7 G/DL (ref 32–36)
MCV RBC AUTO: 95.9 FL (ref 81–99)
MONOCYTES # BLD AUTO: 0.9 10^3/UL (ref 0–1)
MONOCYTES NFR BLD AUTO: 11.6 %
NEUTROPHILS # BLD AUTO: 4.1 10^3/UL (ref 1.5–6.6)
NEUTROPHILS # SNV AUTO: 7.3 X10^3/UL (ref 4.8–10.8)
NEUTROPHILS NFR BLD AUTO: 55.4 %
NRBC # BLD AUTO: 0 /100WBC
NRBC # BLD AUTO: 0 X10^3/UL
PDW BLD AUTO: 10.4 FL (ref 7.9–10.8)
PLATELET # BLD: 205 10^3/UL (ref 130–450)
POTASSIUM SERPL-SCNC: 3.5 MMOL/L (ref 3.5–5)
PROT SPEC-MCNC: 7.6 G/DL (ref 6.7–8.2)
RBC MAR: 5.11 10^6/UL (ref 4.2–5.4)
SODIUM SERPLBLD-SCNC: 139 MMOL/L (ref 135–145)
T3FREE SERPL-MCNC: 3.54 PG/ML (ref 2.5–3.9)
T4 FREE SERPL-MCNC: 0.79 NG/DL (ref 0.58–1.64)
T4 SERPL-MCNC: 8.55 UG/DL (ref 6.09–12.23)
TSH SERPL-ACNC: 2.25 UIU/ML (ref 0.34–5.6)
TSH SERPL-ACNC: 2.28 UIU/ML (ref 0.34–5.6)

## 2021-02-26 PROCEDURE — 36415 COLL VENOUS BLD VENIPUNCTURE: CPT

## 2021-02-26 PROCEDURE — 84443 ASSAY THYROID STIM HORMONE: CPT

## 2021-02-26 PROCEDURE — 84439 ASSAY OF FREE THYROXINE: CPT

## 2021-02-26 PROCEDURE — 84481 FREE ASSAY (FT-3): CPT

## 2021-02-26 PROCEDURE — 80053 COMPREHEN METABOLIC PANEL: CPT

## 2021-02-26 PROCEDURE — 85025 COMPLETE CBC W/AUTO DIFF WBC: CPT

## 2021-02-26 PROCEDURE — 84432 ASSAY OF THYROGLOBULIN: CPT

## 2021-02-26 PROCEDURE — 84436 ASSAY OF TOTAL THYROXINE: CPT

## 2021-02-26 PROCEDURE — 86800 THYROGLOBULIN ANTIBODY: CPT

## 2021-03-02 LAB
THYROGLOB AB SERPL-ACNC: <1 IU/ML
THYROGLOB SERPL-MCNC: 15.4 NG/ML

## 2022-02-25 ENCOUNTER — HOSPITAL ENCOUNTER (OUTPATIENT)
Dept: HOSPITAL 76 - LAB.N | Age: 57
Discharge: HOME | End: 2022-02-25
Attending: PHYSICIAN ASSISTANT
Payer: COMMERCIAL

## 2022-02-25 DIAGNOSIS — D64.9: ICD-10-CM

## 2022-02-25 DIAGNOSIS — E03.9: Primary | ICD-10-CM

## 2022-02-25 DIAGNOSIS — Z13.220: ICD-10-CM

## 2022-02-25 DIAGNOSIS — Z13.1: ICD-10-CM

## 2022-02-25 DIAGNOSIS — E55.9: ICD-10-CM

## 2022-02-25 LAB
ALBUMIN DIAFP-MCNC: 4.4 G/DL (ref 3.2–5.5)
ALBUMIN/GLOB SERPL: 1.3 {RATIO} (ref 1–2.2)
ALP SERPL-CCNC: 95 IU/L (ref 42–121)
ALT SERPL W P-5'-P-CCNC: 37 IU/L (ref 10–60)
ANION GAP SERPL CALCULATED.4IONS-SCNC: 11 MMOL/L (ref 6–13)
AST SERPL W P-5'-P-CCNC: 32 IU/L (ref 10–42)
BASOPHILS NFR BLD AUTO: 0 10^3/UL (ref 0–0.1)
BASOPHILS NFR BLD AUTO: 0.3 %
BILIRUB BLD-MCNC: 0.8 MG/DL (ref 0.2–1)
BUN SERPL-MCNC: 12 MG/DL (ref 6–20)
CALCIUM UR-MCNC: 9.5 MG/DL (ref 8.5–10.3)
CHLORIDE SERPL-SCNC: 100 MMOL/L (ref 101–111)
CHOLEST SERPL-MCNC: 268 MG/DL
CO2 SERPL-SCNC: 26 MMOL/L (ref 21–32)
CREAT SERPLBLD-SCNC: 0.8 MG/DL (ref 0.4–1)
EOSINOPHIL # BLD AUTO: 0.1 10^3/UL (ref 0–0.7)
EOSINOPHIL NFR BLD AUTO: 1.1 %
ERYTHROCYTE [DISTWIDTH] IN BLOOD BY AUTOMATED COUNT: 13.6 % (ref 12–15)
EST. AVERAGE GLUCOSE BLD GHB EST-MCNC: 128 MG/DL (ref 70–100)
GFRSERPLBLD MDRD-ARVRAT: 74 ML/MIN/{1.73_M2} (ref 89–?)
GLOBULIN SER-MCNC: 3.3 G/DL (ref 2.1–4.2)
GLUCOSE SERPL-MCNC: 122 MG/DL (ref 70–100)
HBA1C MFR BLD HPLC: 6.1 % (ref 4.27–6.07)
HCT VFR BLD AUTO: 48.1 % (ref 37–47)
HDLC SERPL-MCNC: 75 MG/DL
HDLC SERPL: 3.6 {RATIO} (ref ?–4.4)
HGB UR QL STRIP: 15.8 G/DL (ref 12–16)
LDLC SERPL CALC-MCNC: 159 MG/DL
LDLC/HDLC SERPL: 2.1 {RATIO} (ref ?–4.4)
LYMPHOCYTES # SPEC AUTO: 3.9 10^3/UL (ref 1.5–3.5)
LYMPHOCYTES NFR BLD AUTO: 32.9 %
MCH RBC QN AUTO: 31.2 PG (ref 27–31)
MCHC RBC AUTO-ENTMCNC: 32.8 G/DL (ref 32–36)
MCV RBC AUTO: 94.9 FL (ref 81–99)
MONOCYTES # BLD AUTO: 0.8 10^3/UL (ref 0–1)
MONOCYTES NFR BLD AUTO: 6.5 %
NEUTROPHILS # BLD AUTO: 6.9 10^3/UL (ref 1.5–6.6)
NEUTROPHILS # SNV AUTO: 11.8 X10^3/UL (ref 4.8–10.8)
NEUTROPHILS NFR BLD AUTO: 58.5 %
NRBC # BLD AUTO: 0 /100WBC
NRBC # BLD AUTO: 0 X10^3/UL
PDW BLD AUTO: 10.7 FL (ref 7.9–10.8)
PLATELET # BLD: 250 10^3/UL (ref 130–450)
POTASSIUM SERPL-SCNC: 3.8 MMOL/L (ref 3.5–5)
PROT SPEC-MCNC: 7.7 G/DL (ref 6.7–8.2)
RBC MAR: 5.07 10^6/UL (ref 4.2–5.4)
SODIUM SERPLBLD-SCNC: 137 MMOL/L (ref 135–145)
T3FREE SERPL-MCNC: 4.5 PG/ML (ref 2.5–3.9)
T4 FREE SERPL-MCNC: 0.92 NG/DL (ref 0.58–1.64)
T4 SERPL-MCNC: 6.8 UG/DL (ref 6.09–12.23)
TRIGL P FAST SERPL-MCNC: 168 MG/DL
TSH SERPL-ACNC: 2.54 UIU/ML (ref 0.34–5.6)
VLDLC SERPL-SCNC: 34 MG/DL

## 2022-02-25 PROCEDURE — 80061 LIPID PANEL: CPT

## 2022-02-25 PROCEDURE — 83721 ASSAY OF BLOOD LIPOPROTEIN: CPT

## 2022-02-25 PROCEDURE — 82306 VITAMIN D 25 HYDROXY: CPT

## 2022-02-25 PROCEDURE — 85025 COMPLETE CBC W/AUTO DIFF WBC: CPT

## 2022-02-25 PROCEDURE — 36415 COLL VENOUS BLD VENIPUNCTURE: CPT

## 2022-02-25 PROCEDURE — 84481 FREE ASSAY (FT-3): CPT

## 2022-02-25 PROCEDURE — 80053 COMPREHEN METABOLIC PANEL: CPT

## 2022-02-25 PROCEDURE — 84439 ASSAY OF FREE THYROXINE: CPT

## 2022-02-25 PROCEDURE — 83036 HEMOGLOBIN GLYCOSYLATED A1C: CPT

## 2022-02-25 PROCEDURE — 86800 THYROGLOBULIN ANTIBODY: CPT

## 2022-02-25 PROCEDURE — 84436 ASSAY OF TOTAL THYROXINE: CPT

## 2022-02-25 PROCEDURE — 84443 ASSAY THYROID STIM HORMONE: CPT

## 2022-02-25 PROCEDURE — 84432 ASSAY OF THYROGLOBULIN: CPT

## 2022-02-28 LAB
THYROGLOB AB SERPL-ACNC: <1 IU/ML
THYROGLOB SERPL-MCNC: 11.2 NG/ML

## 2022-03-02 ENCOUNTER — HOSPITAL ENCOUNTER (OUTPATIENT)
Dept: HOSPITAL 76 - LAB.N | Age: 57
Discharge: HOME | End: 2022-03-02
Attending: PHYSICIAN ASSISTANT
Payer: COMMERCIAL

## 2022-03-02 DIAGNOSIS — D72.829: Primary | ICD-10-CM

## 2022-03-02 LAB
BASOPHILS # BLD MANUAL: 0 10^3/UL (ref 0–0.1)
BASOPHILS NFR BLD AUTO: 0.3 %
CLARITY UR REFRACT.AUTO: (no result)
CRYSTALS URNS QL MICRO: (no result) /LPF
EOSINOPHIL # BLD MANUAL: 0.4 10^3/UL (ref 0–0.7)
EOSINOPHIL NFR BLD AUTO: 1.5 %
ERYTHROCYTE [DISTWIDTH] IN BLOOD BY AUTOMATED COUNT: 13.4 % (ref 12–15)
GLUCOSE UR QL STRIP.AUTO: NEGATIVE MG/DL
HCT VFR BLD AUTO: 49.3 % (ref 37–47)
HGB UR QL STRIP: 16 G/DL (ref 12–16)
KETONES UR QL STRIP.AUTO: NEGATIVE MG/DL
LYMPH ABN NFR BLD MANUAL: 0 %
LYMPHOBLASTS # BLD: 33 %
LYMPHOCYTES # BLD MANUAL: 4.8 10^3/UL (ref 1.5–3.5)
LYMPHOCYTES NFR BLD AUTO: 36.7 %
MANUAL DIF COMMENT BLD-IMP: (no result)
MCH RBC QN AUTO: 31.4 PG (ref 27–31)
MCHC RBC AUTO-ENTMCNC: 32.5 G/DL (ref 32–36)
MCV RBC AUTO: 96.9 FL (ref 81–99)
MONOCYTES # BLD MANUAL: 1.2 10^3/UL (ref 0–1)
MONOCYTES NFR BLD AUTO: 7.4 %
NEUTROPHILS # SNV AUTO: 14.5 X10^3/UL (ref 4.8–10.8)
NEUTROPHILS NFR BLD AUTO: 53.4 %
NEUTROPHILS NFR BLD MANUAL: 8.1 10^3/UL (ref 1.5–6.6)
NEUTS BAND NFR BLD: 2 %
NITRITE UR QL STRIP.AUTO: NEGATIVE
PDW BLD AUTO: 10.7 FL (ref 7.9–10.8)
PH UR STRIP.AUTO: 6 PH (ref 5–7.5)
PLAT MORPH BLD: (no result)
PLATELET # BLD: 284 10^3/UL (ref 130–450)
PLATELET BLD QL SMEAR: (no result)
PROT UR STRIP.AUTO-MCNC: NEGATIVE MG/DL
RBC # UR STRIP.AUTO: NEGATIVE /UL
RBC MAR: 5.09 10^6/UL (ref 4.2–5.4)
RBC MORPH BLD: (no result)
SP GR UR STRIP.AUTO: 1.02 (ref 1–1.03)
SQUAMOUS URNS QL MICRO: (no result)
UROBILINOGEN UR QL STRIP.AUTO: (no result) E.U./DL
UROBILINOGEN UR STRIP.AUTO-MCNC: NEGATIVE MG/DL
WBC # UR MANUAL: (no result) /HPF (ref 0–5)
WBC MORPH BLD: (no result)

## 2022-03-02 PROCEDURE — 81001 URINALYSIS AUTO W/SCOPE: CPT

## 2022-03-02 PROCEDURE — 87086 URINE CULTURE/COLONY COUNT: CPT

## 2022-03-02 PROCEDURE — 85651 RBC SED RATE NONAUTOMATED: CPT

## 2022-03-02 PROCEDURE — 85025 COMPLETE CBC W/AUTO DIFF WBC: CPT

## 2022-03-02 PROCEDURE — 36415 COLL VENOUS BLD VENIPUNCTURE: CPT

## 2022-03-02 PROCEDURE — 81003 URINALYSIS AUTO W/O SCOPE: CPT

## 2022-03-02 PROCEDURE — 86140 C-REACTIVE PROTEIN: CPT

## 2022-04-12 ENCOUNTER — HOSPITAL ENCOUNTER (OUTPATIENT)
Dept: HOSPITAL 76 - DI.N | Age: 57
Discharge: HOME | End: 2022-04-12
Attending: PHYSICIAN ASSISTANT
Payer: COMMERCIAL

## 2022-04-12 DIAGNOSIS — Z12.31: Primary | ICD-10-CM

## 2022-04-13 NOTE — MAMMOGRAPHY REPORT
BILATERAL DIGITAL SCREENING MAMMOGRAM 3D/2D: 4/12/2022

 

CLINICAL: Routine screening.  

 

Comparison is made to exams dated:  4/19/2017 mammogram and 11/26/2013 mammogram - [facility].  There are scattered fibroglandular elements in both breasts.  

 

There is a biopsy clip in the right breast.  

No significant masses, calcifications, or other findings are seen in either breast.  

There has been no significant interval change.

 

IMPRESSION: NEGATIVE

There is no mammographic evidence of malignancy. A 1 year screening mammogram is recommended.  

 

 

This exam was interpreted at Station ID: [id number].  

 

NOTE: For mammograms, a report in lay terms will be sent to the patient. Approximately 15% of breast 
malignancies will not be visualized mammographically. In the management of a palpable breast mass, a 
negative mammogram must not discourage biopsy of a clinically suspicious lesion.

 

Electronically Signed By: So domingo/penrad:4/13/2022 10:11:53  

 

 

 

ACR BI-RADS Category 1: Negative [id number]

PARENCHYMAL PATTERN: (A) - The breast(s) demonstrate(s) scattered fibroglandular densities.

BI-RADS CATEGORY: (1) - 1

RECOMMENDATION: (ANNUAL)  - Recommend routine annual screening mammography.

[id number]

1 year screening

LATERALITY: (B)

## 2022-05-13 ENCOUNTER — HOSPITAL ENCOUNTER (OUTPATIENT)
Dept: HOSPITAL 76 - LAB.N | Age: 57
Discharge: HOME | End: 2022-05-13
Attending: PHYSICIAN ASSISTANT
Payer: COMMERCIAL

## 2022-05-13 DIAGNOSIS — D72.829: ICD-10-CM

## 2022-05-13 DIAGNOSIS — R73.03: Primary | ICD-10-CM

## 2022-05-13 LAB
ALBUMIN DIAFP-MCNC: 4.2 G/DL (ref 3.2–5.5)
ALBUMIN/GLOB SERPL: 1.4 {RATIO} (ref 1–2.2)
ALP SERPL-CCNC: 94 IU/L (ref 42–121)
ALT SERPL W P-5'-P-CCNC: 34 IU/L (ref 10–60)
ANION GAP SERPL CALCULATED.4IONS-SCNC: 11 MMOL/L (ref 6–13)
AST SERPL W P-5'-P-CCNC: 29 IU/L (ref 10–42)
BASOPHILS NFR BLD AUTO: 0 10^3/UL (ref 0–0.1)
BASOPHILS NFR BLD AUTO: 0.3 %
BILIRUB BLD-MCNC: 0.7 MG/DL (ref 0.2–1)
BUN SERPL-MCNC: 13 MG/DL (ref 6–20)
CALCIUM UR-MCNC: 9.4 MG/DL (ref 8.5–10.3)
CHLORIDE SERPL-SCNC: 105 MMOL/L (ref 101–111)
CO2 SERPL-SCNC: 26 MMOL/L (ref 21–32)
CREAT SERPLBLD-SCNC: 0.8 MG/DL (ref 0.4–1)
EOSINOPHIL # BLD AUTO: 0.1 10^3/UL (ref 0–0.7)
EOSINOPHIL NFR BLD AUTO: 1.2 %
ERYTHROCYTE [DISTWIDTH] IN BLOOD BY AUTOMATED COUNT: 13.2 % (ref 12–15)
EST. AVERAGE GLUCOSE BLD GHB EST-MCNC: 128 MG/DL (ref 70–100)
GFRSERPLBLD MDRD-ARVRAT: 74 ML/MIN/{1.73_M2} (ref 89–?)
GLOBULIN SER-MCNC: 3 G/DL (ref 2.1–4.2)
GLUCOSE SERPL-MCNC: 141 MG/DL (ref 70–100)
HBA1C MFR BLD HPLC: 6.1 % (ref 4.27–6.07)
HCT VFR BLD AUTO: 46.7 % (ref 37–47)
HGB UR QL STRIP: 15.2 G/DL (ref 12–16)
LYMPHOCYTES # SPEC AUTO: 3.3 10^3/UL (ref 1.5–3.5)
LYMPHOCYTES NFR BLD AUTO: 35.5 %
MCH RBC QN AUTO: 31.3 PG (ref 27–31)
MCHC RBC AUTO-ENTMCNC: 32.5 G/DL (ref 32–36)
MCV RBC AUTO: 96.3 FL (ref 81–99)
MONOCYTES # BLD AUTO: 0.6 10^3/UL (ref 0–1)
MONOCYTES NFR BLD AUTO: 6.1 %
NEUTROPHILS # BLD AUTO: 5.1 10^3/UL (ref 1.5–6.6)
NEUTROPHILS # SNV AUTO: 9.2 X10^3/UL (ref 4.8–10.8)
NEUTROPHILS NFR BLD AUTO: 55.8 %
NRBC # BLD AUTO: 0 /100WBC
NRBC # BLD AUTO: 0 X10^3/UL
PDW BLD AUTO: 10.8 FL (ref 7.9–10.8)
PLATELET # BLD: 226 10^3/UL (ref 130–450)
POTASSIUM SERPL-SCNC: 4.4 MMOL/L (ref 3.5–5)
PROT SPEC-MCNC: 7.2 G/DL (ref 6.7–8.2)
RBC MAR: 4.85 10^6/UL (ref 4.2–5.4)
SODIUM SERPLBLD-SCNC: 142 MMOL/L (ref 135–145)

## 2022-05-13 PROCEDURE — 36415 COLL VENOUS BLD VENIPUNCTURE: CPT

## 2022-05-13 PROCEDURE — 80053 COMPREHEN METABOLIC PANEL: CPT

## 2022-05-13 PROCEDURE — 83036 HEMOGLOBIN GLYCOSYLATED A1C: CPT

## 2022-05-13 PROCEDURE — 85025 COMPLETE CBC W/AUTO DIFF WBC: CPT

## 2022-07-15 ENCOUNTER — HOSPITAL ENCOUNTER (OUTPATIENT)
Dept: HOSPITAL 76 - LAB.N | Age: 57
Discharge: HOME | End: 2022-07-15
Attending: INTERNAL MEDICINE
Payer: COMMERCIAL

## 2022-07-15 DIAGNOSIS — E78.5: ICD-10-CM

## 2022-07-15 DIAGNOSIS — I10: Primary | ICD-10-CM

## 2022-07-15 LAB
ANION GAP SERPL CALCULATED.4IONS-SCNC: 13 MMOL/L (ref 6–13)
BASOPHILS NFR BLD AUTO: 0 10^3/UL (ref 0–0.1)
BASOPHILS NFR BLD AUTO: 0.4 %
BUN SERPL-MCNC: 12 MG/DL (ref 6–20)
CALCIUM UR-MCNC: 9.9 MG/DL (ref 8.5–10.3)
CHLORIDE SERPL-SCNC: 102 MMOL/L (ref 101–111)
CHOLEST SERPL-MCNC: 275 MG/DL
CO2 SERPL-SCNC: 27 MMOL/L (ref 21–32)
CREAT SERPLBLD-SCNC: 0.8 MG/DL (ref 0.4–1)
EOSINOPHIL # BLD AUTO: 0.1 10^3/UL (ref 0–0.7)
EOSINOPHIL NFR BLD AUTO: 1.2 %
ERYTHROCYTE [DISTWIDTH] IN BLOOD BY AUTOMATED COUNT: 13.7 % (ref 12–15)
GFRSERPLBLD MDRD-ARVRAT: 74 ML/MIN/{1.73_M2} (ref 89–?)
GLUCOSE SERPL-MCNC: 127 MG/DL (ref 70–100)
HCT VFR BLD AUTO: 48.5 % (ref 37–47)
HDLC SERPL-MCNC: 73 MG/DL
HDLC SERPL: 3.8 {RATIO} (ref ?–4.4)
HGB UR QL STRIP: 15.7 G/DL (ref 12–16)
LDLC SERPL CALC-MCNC: 163 MG/DL
LDLC/HDLC SERPL: 2.2 {RATIO} (ref ?–4.4)
LYMPHOCYTES # SPEC AUTO: 3.6 10^3/UL (ref 1.5–3.5)
LYMPHOCYTES NFR BLD AUTO: 37.2 %
MCH RBC QN AUTO: 31.3 PG (ref 27–31)
MCHC RBC AUTO-ENTMCNC: 32.4 G/DL (ref 32–36)
MCV RBC AUTO: 96.8 FL (ref 81–99)
MONOCYTES # BLD AUTO: 0.7 10^3/UL (ref 0–1)
MONOCYTES NFR BLD AUTO: 7 %
NEUTROPHILS # BLD AUTO: 5.1 10^3/UL (ref 1.5–6.6)
NEUTROPHILS # SNV AUTO: 9.6 X10^3/UL (ref 4.8–10.8)
NEUTROPHILS NFR BLD AUTO: 53.6 %
NRBC # BLD AUTO: 0 /100WBC
NRBC # BLD AUTO: 0 X10^3/UL
PDW BLD AUTO: 10.6 FL (ref 7.9–10.8)
PLATELET # BLD: 261 10^3/UL (ref 130–450)
POTASSIUM SERPL-SCNC: 4.4 MMOL/L (ref 3.5–5)
RBC MAR: 5.01 10^6/UL (ref 4.2–5.4)
SODIUM SERPLBLD-SCNC: 142 MMOL/L (ref 135–145)
TRIGL P FAST SERPL-MCNC: 194 MG/DL
VLDLC SERPL-SCNC: 39 MG/DL

## 2022-07-15 PROCEDURE — 80048 BASIC METABOLIC PNL TOTAL CA: CPT

## 2022-07-15 PROCEDURE — 80061 LIPID PANEL: CPT

## 2022-07-15 PROCEDURE — 83721 ASSAY OF BLOOD LIPOPROTEIN: CPT

## 2022-07-15 PROCEDURE — 85025 COMPLETE CBC W/AUTO DIFF WBC: CPT

## 2022-07-15 PROCEDURE — 36415 COLL VENOUS BLD VENIPUNCTURE: CPT

## 2022-09-23 ENCOUNTER — HOSPITAL ENCOUNTER (OUTPATIENT)
Dept: HOSPITAL 76 - LAB.N | Age: 57
Discharge: HOME | End: 2022-09-23
Attending: INTERNAL MEDICINE
Payer: COMMERCIAL

## 2022-09-23 DIAGNOSIS — E78.5: Primary | ICD-10-CM

## 2022-09-23 DIAGNOSIS — I10: ICD-10-CM

## 2022-09-23 LAB
ANION GAP SERPL CALCULATED.4IONS-SCNC: 11 MMOL/L (ref 6–13)
BASOPHILS NFR BLD AUTO: 0.1 10^3/UL (ref 0–0.1)
BASOPHILS NFR BLD AUTO: 0.4 %
BUN SERPL-MCNC: 10 MG/DL (ref 6–20)
CALCIUM UR-MCNC: 9.5 MG/DL (ref 8.5–10.3)
CHLORIDE SERPL-SCNC: 104 MMOL/L (ref 101–111)
CHOLEST SERPL-MCNC: 207 MG/DL
CO2 SERPL-SCNC: 25 MMOL/L (ref 21–32)
CREAT SERPLBLD-SCNC: 0.9 MG/DL (ref 0.4–1)
EOSINOPHIL # BLD AUTO: 0.1 10^3/UL (ref 0–0.7)
EOSINOPHIL NFR BLD AUTO: 1.1 %
ERYTHROCYTE [DISTWIDTH] IN BLOOD BY AUTOMATED COUNT: 13.2 % (ref 12–15)
GFRSERPLBLD MDRD-ARVRAT: 64 ML/MIN/{1.73_M2} (ref 89–?)
GLUCOSE SERPL-MCNC: 132 MG/DL (ref 70–100)
HCT VFR BLD AUTO: 46.2 % (ref 37–47)
HDLC SERPL-MCNC: 68 MG/DL
HDLC SERPL: 3 {RATIO} (ref ?–4.4)
HGB UR QL STRIP: 15 G/DL (ref 12–16)
LDLC SERPL CALC-MCNC: 108 MG/DL
LDLC/HDLC SERPL: 1.6 {RATIO} (ref ?–4.4)
LYMPHOCYTES # SPEC AUTO: 3.5 10^3/UL (ref 1.5–3.5)
LYMPHOCYTES NFR BLD AUTO: 30.9 %
MCH RBC QN AUTO: 30.9 PG (ref 27–31)
MCHC RBC AUTO-ENTMCNC: 32.5 G/DL (ref 32–36)
MCV RBC AUTO: 95.3 FL (ref 81–99)
MONOCYTES # BLD AUTO: 0.8 10^3/UL (ref 0–1)
MONOCYTES NFR BLD AUTO: 6.9 %
NEUTROPHILS # BLD AUTO: 6.8 10^3/UL (ref 1.5–6.6)
NEUTROPHILS # SNV AUTO: 11.4 X10^3/UL (ref 4.8–10.8)
NEUTROPHILS NFR BLD AUTO: 60.2 %
NRBC # BLD AUTO: 0 /100WBC
NRBC # BLD AUTO: 0 X10^3/UL
PDW BLD AUTO: 10.6 FL (ref 7.9–10.8)
PLATELET # BLD: 250 10^3/UL (ref 130–450)
POTASSIUM SERPL-SCNC: 3.9 MMOL/L (ref 3.5–5)
RBC MAR: 4.85 10^6/UL (ref 4.2–5.4)
SODIUM SERPLBLD-SCNC: 140 MMOL/L (ref 135–145)
TRIGL P FAST SERPL-MCNC: 157 MG/DL
VLDLC SERPL-SCNC: 31 MG/DL

## 2022-09-23 PROCEDURE — 36415 COLL VENOUS BLD VENIPUNCTURE: CPT

## 2022-09-23 PROCEDURE — 85025 COMPLETE CBC W/AUTO DIFF WBC: CPT

## 2022-09-23 PROCEDURE — 80061 LIPID PANEL: CPT

## 2022-09-23 PROCEDURE — 80048 BASIC METABOLIC PNL TOTAL CA: CPT

## 2022-09-23 PROCEDURE — 83721 ASSAY OF BLOOD LIPOPROTEIN: CPT
